# Patient Record
(demographics unavailable — no encounter records)

---

## 2024-12-04 NOTE — CARDIOLOGY SUMMARY
[LVEF ___%] : LVEF [unfilled]% [___] : [unfilled] [de-identified] : ROBINA July 2024 sinus with rare ectopy 1 symptom correlates with PAC, longest atrial run is 8 seconds 18 beats

## 2024-12-04 NOTE — HISTORY OF PRESENT ILLNESS
[FreeTextEntry1] : Mr Elizabeth was initially  referred by Dr Trevizo  2020  for chest pain shortly after His brother  suddenly at the age of 60 of an MI .   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   There have been no hospitalizations since last visit. He denies chest pain, dyspnea, palpitations or syncope.

## 2024-12-06 NOTE — HISTORY OF PRESENT ILLNESS
[FreeTextEntry1] : Mr Elizabeth was initially  referred by Dr Trevizo  2020  for chest pain shortly after His brother  suddenly at the age of 60 of an MI .   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   There have been no hospitalizations since last visit.  He denies chest pain, dyspnea, palpitations or syncope.  His dyspnea on exertion has improved, he feels this is worse in the summer and in the heat.  He does not note that he has chronic sinusitis and cannot breathe through his nose.  He had a normal chest x-ray at the [facility] surveillance [facility].  He tells me his pulmonary function testing was also normal.  He exercises 5-6 days a week weights, machines and tennis

## 2024-12-06 NOTE — HISTORY OF PRESENT ILLNESS
[FreeTextEntry1] : Mr Elizabeth was initially  referred by Dr Trevizo  2020  for chest pain shortly after His brother  suddenly at the age of 60 of an MI .   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   There have been no hospitalizations since last visit.  He denies chest pain, dyspnea, palpitations or syncope.  His dyspnea on exertion has improved, he feels this is worse in the summer and in the heat.  He does not note that he has chronic sinusitis and cannot breathe through his nose.  He had a normal chest x-ray at the [facility] surveillance center.  He tells me his pulmonary function testing was also normal.  He exercises 5-6 days a week weights, machines and tennis

## 2024-12-06 NOTE — CARDIOLOGY SUMMARY
[de-identified] : ROBINA July 2024 sinus with rare ectopy 1 symptom correlates with PAC, longest atrial run is 8 seconds 18 beats

## 2024-12-06 NOTE — CARDIOLOGY SUMMARY
[de-identified] : ROBINA July 2024 sinus with rare ectopy 1 symptom correlates with PAC, longest atrial run is 8 seconds 18 beats